# Patient Record
Sex: FEMALE | Race: WHITE | Employment: OTHER | URBAN - METROPOLITAN AREA
[De-identification: names, ages, dates, MRNs, and addresses within clinical notes are randomized per-mention and may not be internally consistent; named-entity substitution may affect disease eponyms.]

---

## 2022-03-02 ENCOUNTER — HOSPITAL ENCOUNTER (EMERGENCY)
Age: 67
Discharge: HOME | End: 2022-03-02
Attending: EMERGENCY MEDICINE
Payer: MEDICARE

## 2022-03-02 VITALS
HEART RATE: 86 BPM | TEMPERATURE: 98 F | OXYGEN SATURATION: 96 % | SYSTOLIC BLOOD PRESSURE: 138 MMHG | WEIGHT: 234 LBS | BODY MASS INDEX: 38.99 KG/M2 | DIASTOLIC BLOOD PRESSURE: 76 MMHG | RESPIRATION RATE: 20 BRPM | HEIGHT: 65 IN

## 2022-03-02 DIAGNOSIS — S05.01XA RIGHT CORNEAL ABRASION, INITIAL ENCOUNTER: Primary | ICD-10-CM

## 2022-03-02 PROCEDURE — 99284 EMERGENCY DEPT VISIT MOD MDM: CPT

## 2022-03-02 PROCEDURE — 6370000000 HC RX 637 (ALT 250 FOR IP): Performed by: EMERGENCY MEDICINE

## 2022-03-02 RX ORDER — LOSARTAN POTASSIUM 25 MG/1
25 TABLET ORAL DAILY
COMMUNITY

## 2022-03-02 RX ORDER — CYCLOSPORINE 0.5 MG/ML
1 EMULSION OPHTHALMIC 2 TIMES DAILY
COMMUNITY

## 2022-03-02 RX ORDER — HYDROCHLOROTHIAZIDE 25 MG/1
25 TABLET ORAL DAILY
COMMUNITY

## 2022-03-02 RX ADMIN — FLUORESCEIN SODIUM 1 MG: 1 STRIP OPHTHALMIC at 17:49

## 2022-03-02 ASSESSMENT — VISUAL ACUITY
OS: 20/25
OD: 20/40
OU: 20/25

## 2022-03-02 NOTE — ED TRIAGE NOTES
65y/o female presents to the ED with right eye pain and drainage. Pt states that she has dry eye syndrome and being tx with eye ointments and drops. She said symptoms were better until today, where she noticed redness, swelling, and watery eyes. -n/v/f/c/d.

## 2022-03-03 NOTE — ED PROVIDER NOTES
CHIEF COMPLAINT  Eye Pain (right )      HISTORY OF PRESENT ILLNESS  Mannie Keller  is a 77 y.o. female who presents to the ED at via private vehicle complaining of right eye pain. Patient reports that she has a history of \"dry eyes. \"She follows with ophthalmology in [facility] and was traveling to [place] for play this evening when she had sudden onset pain in her right eye. Patient noted clear tearing as well as pain with blinking and bright lights. There are no other complaints, modifying factors or associated symptoms. Nursing notes reviewed. Past medical history:  has a past medical history of Diabetes mellitus ([address] Dry eye, and Hypertension. Past surgical history:  has a past surgical history that includes Cholecystectomy. Home medications:   Prior to Admission medications    Medication Sig Start Date End Date Taking? Authorizing Provider   losartan (COZAAR) 25 MG tablet Take 25 mg by mouth daily   Yes Historical Provider, MD   metFORMIN (GLUCOPHAGE) 500 MG tablet Take 500 mg by mouth 2 times daily (with meals)   Yes Historical Provider, MD   hydroCHLOROthiazide (HYDRODIURIL) 25 MG tablet Take 25 mg by mouth daily   Yes Historical Provider, MD   cycloSPORINE (RESTASIS) 0.05 % ophthalmic emulsion 1 drop 2 times daily   Yes Historical Provider, MD       No Known Allergies    Social history:  reports that she has never smoked. She has never used smokeless tobacco. She reports current alcohol use. She reports that she does not use drugs. Family history:  History reviewed. No pertinent family history. REVIEW OF SYSTEMS  6 systems reviewed, pertinent positives per HPI otherwise noted to be negative    PHYSICAL EXAM  Vitals:    03/02/22 1800   BP: 138/76   Pulse: 86   Resp: 20   Temp:    SpO2: 96%       GENERAL: Patient is well-developed, well-nourished,  no acute distress. Moderate apparent discomfort. Non toxic appearing. HEENT:  Normocephalic, atraumatic. PERRL.   Conjunctiva appear normal.  Visual acuity: 20/25 bilateral.  Right 20/40. Left 20/25. Clear tearing noted. Fluorescein/Woods lamp: Small area of uptake in the 6 o'clock position of the right cornea. Negative Christina sign. External ears are normal.  MMM  NECK: Supple with normal ROM. Trachea midline  LUNGS:  Normal work of breathing. Speaking comfortably in full sentences. EXTREMITIES: 2+ distal pulses w/o edema. MUSCULOSKELETAL:  Atraumatic extremities with normal ROM grossly. No obvious bony deformities. SKIN: Warm/dry. No rashes/lesions noted. PSYCHIATRIC: Patient is alert and oriented with normal affect  NEUROLOGIC: Cranial nerves grossly intact. Moves all extremities with equal strength. No gross sensory deficits. Answers questions/follows commands appropriately. ED COURSE/MDM  Nursing notes reviewed. Pt was given the following medications or treatments in the ED: none      Patient reports that she has pain medications as well as topical antibiotics at home. Clinical Impression  Based on the presenting complaint, history, and physical exam, multiple diagnoses were considered. Exam and workup here most c/w:  1. Right corneal abrasion, initial encounter        I discussed with Mamadou Badillo the results of evaluation in the ED, diagnosis, care, and prognosis. The plan is to discharge to home. Patient is in agreement with plan and questions have been answered. I also discussed with Mamadou Badillo the reasons which may require a return visit and the importance of follow-up care. The patient is well-appearing, nontoxic, and improved at the time of discharge. Patient agrees to call to arrange follow-up care as directed. Mamadou Badillo understands to return immediately for worsening/change in symptoms.       Patient will be started on the following medications from the ED:  Discharge Medication List as of 3/2/2022  6:35 PM            Disposition  Pt is discharged in stable condition.     Disposition Vitals:  /76   Pulse 86   Temp 98 °F (36.7 °C)   Resp 20   Ht 5' 5\" (1.651 m)   Wt 234 lb (106.1 kg)   SpO2 96%   Breastfeeding No   BMI 38.94 kg/m²                     Pipe Clinton DO  03/02/22 1946